# Patient Record
Sex: FEMALE | URBAN - METROPOLITAN AREA
[De-identification: names, ages, dates, MRNs, and addresses within clinical notes are randomized per-mention and may not be internally consistent; named-entity substitution may affect disease eponyms.]

---

## 2024-05-29 ENCOUNTER — APPOINTMENT (RX ONLY)
Dept: URBAN - METROPOLITAN AREA CLINIC 141 | Facility: CLINIC | Age: 59
Setting detail: DERMATOLOGY
End: 2024-05-29

## 2024-05-29 DIAGNOSIS — L30.4 ERYTHEMA INTERTRIGO: ICD-10-CM

## 2024-05-29 PROCEDURE — 99203 OFFICE O/P NEW LOW 30 MIN: CPT

## 2024-05-29 PROCEDURE — ? COUNSELING

## 2024-05-29 ASSESSMENT — LOCATION DETAILED DESCRIPTION DERM
LOCATION DETAILED: RIGHT ANTERIOR PROXIMAL THIGH
LOCATION DETAILED: LEFT ANTERIOR PROXIMAL THIGH

## 2024-05-29 ASSESSMENT — LOCATION SIMPLE DESCRIPTION DERM
LOCATION SIMPLE: LEFT THIGH
LOCATION SIMPLE: RIGHT THIGH

## 2024-05-29 ASSESSMENT — LOCATION ZONE DERM: LOCATION ZONE: LEG

## 2024-07-03 ENCOUNTER — APPOINTMENT (RX ONLY)
Dept: URBAN - METROPOLITAN AREA CLINIC 141 | Facility: CLINIC | Age: 59
Setting detail: DERMATOLOGY
End: 2024-07-03

## 2024-07-03 DIAGNOSIS — D22 MELANOCYTIC NEVI: ICD-10-CM

## 2024-07-03 DIAGNOSIS — D18.0 HEMANGIOMA: ICD-10-CM

## 2024-07-03 DIAGNOSIS — L81.4 OTHER MELANIN HYPERPIGMENTATION: ICD-10-CM

## 2024-07-03 DIAGNOSIS — Z71.89 OTHER SPECIFIED COUNSELING: ICD-10-CM

## 2024-07-03 DIAGNOSIS — L82.1 OTHER SEBORRHEIC KERATOSIS: ICD-10-CM

## 2024-07-03 DIAGNOSIS — L73.8 OTHER SPECIFIED FOLLICULAR DISORDERS: ICD-10-CM

## 2024-07-03 PROBLEM — D18.01 HEMANGIOMA OF SKIN AND SUBCUTANEOUS TISSUE: Status: ACTIVE | Noted: 2024-07-03

## 2024-07-03 PROBLEM — D22.5 MELANOCYTIC NEVI OF TRUNK: Status: ACTIVE | Noted: 2024-07-03

## 2024-07-03 PROCEDURE — ? DEFER

## 2024-07-03 PROCEDURE — ? SUNSCREEN RECOMMENDATIONS

## 2024-07-03 PROCEDURE — ? COUNSELING

## 2024-07-03 PROCEDURE — 99213 OFFICE O/P EST LOW 20 MIN: CPT

## 2024-07-03 ASSESSMENT — LOCATION ZONE DERM
LOCATION ZONE: FACE
LOCATION ZONE: TRUNK

## 2024-07-03 ASSESSMENT — LOCATION DETAILED DESCRIPTION DERM
LOCATION DETAILED: RIGHT SUPERIOR UPPER BACK
LOCATION DETAILED: RIGHT INFERIOR LATERAL FOREHEAD
LOCATION DETAILED: UPPER STERNUM
LOCATION DETAILED: SUPERIOR THORACIC SPINE
LOCATION DETAILED: RIGHT MEDIAL UPPER BACK
LOCATION DETAILED: SUBXIPHOID
LOCATION DETAILED: LEFT INFERIOR LATERAL FOREHEAD

## 2024-07-03 ASSESSMENT — LOCATION SIMPLE DESCRIPTION DERM
LOCATION SIMPLE: UPPER BACK
LOCATION SIMPLE: ABDOMEN
LOCATION SIMPLE: CHEST
LOCATION SIMPLE: LEFT FOREHEAD
LOCATION SIMPLE: RIGHT FOREHEAD
LOCATION SIMPLE: RIGHT UPPER BACK

## 2024-07-03 NOTE — PROCEDURE: DEFER
Introduction Text (Please End With A Colon): The following procedure was deferred:
Size Of Lesion In Cm (Optional): 0.8
Detail Level: Detailed
Procedure To Be Performed At Next Visit: Excision
X Size Of Lesion In Cm (Optional): 0.6

## 2024-07-03 NOTE — PROCEDURE: COUNSELING
June 25, 2021      José Antonio Saha  [id number]  [place] [address]        Dear ,    The following letter pertains to your most recent diagnostic tests:       This is a negative urine culture result       Sincerely,     Dr. Chavez     Resulted Orders   Urine Culture Aerobic Bacterial   Result Value Ref Range    Specimen Description Midstream Urine     Special Requests Specimen received in preservative     Culture Micro       <10,000 colonies/mL  urogenital byron  Susceptibility testing not routinely done         
Detail Level: Generalized
Detail Level: Zone